# Patient Record
Sex: FEMALE | Race: WHITE | Employment: STUDENT | URBAN - METROPOLITAN AREA
[De-identification: names, ages, dates, MRNs, and addresses within clinical notes are randomized per-mention and may not be internally consistent; named-entity substitution may affect disease eponyms.]

---

## 2020-03-17 ENCOUNTER — OFFICE VISIT (OUTPATIENT)
Dept: ORTHOPEDIC SURGERY | Age: 25
End: 2020-03-17

## 2020-03-17 VITALS
HEIGHT: 67 IN | HEART RATE: 62 BPM | WEIGHT: 155 LBS | DIASTOLIC BLOOD PRESSURE: 65 MMHG | SYSTOLIC BLOOD PRESSURE: 125 MMHG | BODY MASS INDEX: 24.33 KG/M2

## 2020-03-17 PROCEDURE — 99999 PR OFFICE/OUTPT VISIT,PROCEDURE ONLY: CPT | Performed by: ORTHOPAEDIC SURGERY

## 2020-03-17 RX ORDER — DICLOFENAC SODIUM 75 MG/1
50 TABLET, DELAYED RELEASE ORAL 2 TIMES DAILY
Qty: 14 TABLET | Refills: 2 | Status: SHIPPED | OUTPATIENT
Start: 2020-03-17

## 2020-03-17 RX ORDER — ETONOGESTREL 68 MG/1
68 IMPLANT SUBCUTANEOUS ONCE
COMMUNITY

## 2020-03-17 RX ORDER — DIAZEPAM 2 MG/1
2 TABLET ORAL PRN
COMMUNITY

## 2020-03-17 NOTE — PROGRESS NOTES
Chief Complaint    New Patient (right ankle )      History of Present Illness:  Jose Angel Abreu is a 22 y.o. female who presents for evaluation of persistent right ankle pain and swelling. She had injured her ankle on 1/10/2020 when she missed a step walking outside of her home and inverted her ankle. She had immediate onset of swelling and she felt a pop in her ankle. She also reported subjective ecchymosis and bruising along both the lateral and medial malleolus of her ankle. She was thereafter seen by physician in [address] who had placed her in a boot immobilizer for a number of days as she had pain and difficulty weightbearing. She was diagnosed with a grade 3 lateral ligament sprain of her ATFL and CFL. A high ankle sprain was ruled out. She was then enrolled in a physical therapy program which ultimately focused on regaining movement and strength. She then moved to [facility] about 5 weeks later and the plan was to seek further medical evaluation for her ankle. however she got busy with work as she started a new job at [employer]. She is now back in [place] as this is her hometown, and given  the corona virus outbreak her work in [facility] has been put on hold. Nonetheless ,today she continues endorsing persistent discomfort on the outside of her right ankle. She also has frequent and consistent ankle swelling, worse at the end of a regular day of walking. She describes herself as not being able to trust her ankle on certain weightbearing positions. She has pain specifically go going down the stairs on the outside and posterior aspect of her ankle. She does often feel it deep inside her ankle joint however. She has no persistent inversion injuries. She denies any consistent popping or subluxations along the outside of her ankle or peroneal tendons. She has no numbness or tingling or any weakness in her toes or ankle or lower extremity.   She is not currently taking any anti-inflammatory medications. She does have a lace up ankle brace but she is not wearing it consistently. Ultimately, she feels as though her symptoms have plateaued and that she is no longer improving after the 5-week oleg from the date of her injury. This is despite physical therapy and adequate period of rest and strengthening. Pain Assessment  Location of Pain: Ankle  Location Modifiers: Right  Severity of Pain: 4  Quality of Pain: Other (Comment), Dull(sore)  Duration of Pain: Persistent  Frequency of Pain: Constant  Aggravating Factors: Other (Comment), Stairs(certain movement)  Relieving Factors: Rest, Ice  Result of Injury: Yes  Work-Related Injury: No  Are there other pain locations you wish to document?: No    No past medical history on file. No past surgical history on file. No family history on file. Social History     Socioeconomic History    Marital status: Single     Spouse name: None    Number of children: None    Years of education: None    Highest education level: None   Occupational History    None   Social Needs    Financial resource strain: None    Food insecurity     Worry: None     Inability: None    Transportation needs     Medical: None     Non-medical: None   Tobacco Use    Smoking status: Never Smoker    Smokeless tobacco: Never Used   Substance and Sexual Activity    Alcohol use:  Yes    Drug use: Never    Sexual activity: None   Lifestyle    Physical activity     Days per week: None     Minutes per session: None    Stress: None   Relationships    Social connections     Talks on phone: None     Gets together: None     Attends [employer] service: None     Active member of club or organization: None     Attends meetings of clubs or organizations: None     Relationship status: None    Intimate partner violence     Fear of current or ex partner: None     Emotionally abused: None     Physically abused: None     Forced sexual activity: None   Other Topics Concern    None Social History Narrative    None       Current Outpatient Medications   Medication Sig Dispense Refill    etonogestrel (NEXPLANON) 68 MG implant 68 mg by Subdermal route once      diazePAM (VALIUM) 2 MG tablet Take 2 mg by mouth as needed for Anxiety. No current facility-administered medications for this visit. Allergies   Allergen Reactions    Sudafed [Pseudoephedrine Hcl] Nausea Only and Other (See Comments)     Dizziness          Review of Systems:  A 14 point review of systems was completed by the patient and is available in the media section of the scanned medical record and was reviewed on 3/17/2020. The review is negative with the exception of those things mentioned in the HPI and Past Medical History   Review of Systems 3/17/2020      Vital Signs:   /65   Pulse 62   Ht 5' 7\" (1.702 m)   Wt 155 lb (70.3 kg)   BMI 24.28 kg/m²     General Exam:    Neuro: Alert & Oriented x 3,  normal,  no focal deficits noted. Normal mood & affect. Eyes: Sclera clear  Ears: Normal external ear  Mouth:  No perioral lesions  Pulm: Respirations unlabored and regular   Skin: Warm, well perfused      Ankle exam:  Right ANKLE  Inspection: Mild swelling in the right ankle, compared to the left. No evidence of erythema or cellulitis around the ankle. No evidence of bruising    Range of motion: -10 to 45 degrees of plantarflexion. 45 degrees of inversion associated with no pain, 25 degrees of eversion. Normal Silverskjold test with no evidence of gastroc-achilles contracture. Resisted strength testin out of 5 strength in dorsiflexion, plantarflexion, inversion, and eversion. Palpation: no Tender along the anterolateral joint line nonanterolateral ligamentous complex. Nontender around the Achilles nor deltoid ligament nor any bony prominences. Tenderness along the peroneal tendons at the level of the superior peroneal retinaculum behind the lateral malleolus.     Stability test: Slightly abnormal anterior drawer but with solid endpoint. Neurovascular exam: Normal neuro vascular exam of the ankle and foot. Contralateral ankle exam for comparison: LEFT  Inspection: No evidence of swelling erythema or cellulitis around the ankle. No evidence of bruising    Range of motion: -10 to 45 degrees of plantarflexion. 45 degrees of inversion associated with no pain, 25 degrees of eversion. Normal Silverskjold test with no evidence of gastroc-achilles contracture. Resisted strength testin out of 5 strength in dorsiflexion, plantarflexion, inversion, and eversion. Palpation: no Tender along the anterolateral joint line nonanterolateral ligamentous complex. Nontender around the Achilles nor deltoid ligament nor any bony prominences. Stability test: Normal anterior drawer with solid endpoint. Neurovascular exam: Normal neuro vascular exam of the ankle and foot. Radiology:     3 View X-rays (AP, Lateral, and Oblique ) of the right ankle were obtained and reviewed in office. Impression: No evidence of acute fracture dislocation or loose body. No evidence of an acute osteochondral injury along the talus. No evidence of an avulsion fracture along the lateral nor the medial malleolus. No evidence of Chaka deformity. No evidence of joint space narrowing neither along the tibiotalar joint nor the subtalar joint. Assessment/Plan: Patient is a 22 y.o. female with lateral sided ankle pain for the past 2 months since an inversion ankle injury on January 10, 2020. The pain and swelling have persisted despite a period of rest, immobilization in a boot, extensive physical therapy focused on range of motion and strengthening, and the use of a lace up ankle brace. Her symptoms of persistent swelling and giving way in her ankle are concerning for lateral ligament deficiency versus injury to her superior peroneal retinaculum.  She may also have an intra-articular osteochondral [address]   Email: [email or web address]  Cell: [phone]        03/17/20  2:02 PM    Office Procedures:  Orders Placed This Encounter   Procedures    XR ANKLE RIGHT (MIN 3 VIEWS)     Standing Status:   Future     Number of Occurrences:   1     Standing Expiration Date:   3/17/2021              This dictation was performed with a verbal recognition program (DRAGON) and it was checked for errors. It is possible that there are still dictated errors within this office note. If so, please bring any errors to my attention for an addendum. All efforts were made to ensure that this office note is accurate.

## 2020-03-26 NOTE — PROGRESS NOTES
Chief Complaint    No chief complaint on file. History of Present Illness:  Zeferino Nevarez is a 22 y.o. femalehere for evaluation chief complaint of right ankle pain. She states that on 1/10/2020 she missed a step and sustained a plantarflexion inversion injury. She thinks she is injured both of her ankles in the past but is not sure. She thinks these occurred both in high school and college. With this injury she was seen in [address] where she was placed into a high tide boot and after 2-1/2 weeks told to come out of the boot even though she still had pain and go into a Russell type brace. She started physical therapy and then eventually had to go to [facility] for work. She left [facility] on 3/14/2020. States that her pain right now is around a 2 out of 10. She notices a pinching sensation with going down steps or any hyperdorsiflexion type motion. Rest and ice seem to have helped somewhat. She was seen by Dr. Jerald Braxton' fellow on 3/17/2020 and was sent for an MRI scan and referred here. .        Medical History:  Patient's medications, allergies, past medical, surgical, social and family histories were reviewed and updated as appropriate. Review of Systems:  Pertinent items are noted in HPI  Review of systems reviewed from Patient History Form dated on 3/27/2020 and available in the patient's chart under the Media tab. Vital Signs: There were no vitals taken for this visit. General Exam:   Constitutional: Patient is adequately groomed with no evidence of malnutrition  DTRs: Deep tendon reflexes are intact  Mental Status: The patient is oriented to time, place and person. The patient's mood and affect are appropriate. Lymphatic: The lymphatic examination bilaterally reveals all areas to be without enlargement or induration.     Foot Examination:    Inspection: Mild swelling anterior lateral right ankle    Palpation: Tenderness over the ATFL no pain in the syndesmosis    Range of Motion: 10 degrees of dorsiflexion 40 degrees of plantarflexion    Strength: 4/5 in the plantarflexion eversion with very little discomfort    Special Tests: Anterior drawer and talar tilt show mild laxity bilaterally she also can hyperextend at the elbow    Skin: There are no rashes, ulcerations or lesions. Gait: Antalgic    Reflex 2+ and symmetric    Additional Comments:       Additional Examinations:         Left Lower Extremity: Examination of the left lower extremity does not show any tenderness, deformity or injury. Range of motion is unremarkable. There is no gross instability. There are no rashes, ulcerations or lesions. Strength and tone are normal.    Radiology:     X-rays obtained and reviewed in office:  Views 3  Location right ankle  Impression obtained previously shows no evidence of fracture  MRI scan showed evidence of a anterior talofibular ligament and calcaneofibular ligament disruption          Assessment : Right anterior lateral ankle sprain    Impression:  No diagnosis found. Office Procedures:  No orders of the defined types were placed in this encounter. Treatment Plan:  The etiology of anterior lateral ankle instability and it's appropriate treatment were discussed in great detail. I wrote out her plan of care and copied into the media section of her chart. Operative option would be ankle arthroscopy lateral ligament repair using internal brace and a Broström over the top. We discussed rehab. I recommend she start with the nonoperative which would be activity modification including use of shoe and brace all the time she will take Tylenol instead of nonsteroidals due to the effect on COVID when she can start physical therapy and follow-up with me as needed. I spent 45 minutes with this patient greater than 50% of the time face-to-face discussing treatment options.

## 2020-03-27 ENCOUNTER — OFFICE VISIT (OUTPATIENT)
Dept: ORTHOPEDIC SURGERY | Age: 25
End: 2020-03-27
Payer: COMMERCIAL

## 2020-03-27 VITALS — WEIGHT: 154.98 LBS | HEIGHT: 67 IN | BODY MASS INDEX: 24.33 KG/M2

## 2020-03-27 PROCEDURE — G8420 CALC BMI NORM PARAMETERS: HCPCS | Performed by: ORTHOPAEDIC SURGERY

## 2020-03-27 PROCEDURE — G8484 FLU IMMUNIZE NO ADMIN: HCPCS | Performed by: ORTHOPAEDIC SURGERY

## 2020-03-27 PROCEDURE — G8427 DOCREV CUR MEDS BY ELIG CLIN: HCPCS | Performed by: ORTHOPAEDIC SURGERY

## 2020-03-27 PROCEDURE — 1036F TOBACCO NON-USER: CPT | Performed by: ORTHOPAEDIC SURGERY

## 2020-03-27 PROCEDURE — 99204 OFFICE O/P NEW MOD 45 MIN: CPT | Performed by: ORTHOPAEDIC SURGERY
